# Patient Record
Sex: FEMALE | Race: ASIAN | NOT HISPANIC OR LATINO
[De-identification: names, ages, dates, MRNs, and addresses within clinical notes are randomized per-mention and may not be internally consistent; named-entity substitution may affect disease eponyms.]

---

## 2022-04-09 PROBLEM — Z00.00 ENCOUNTER FOR PREVENTIVE HEALTH EXAMINATION: Status: ACTIVE | Noted: 2022-04-09

## 2022-04-11 ENCOUNTER — APPOINTMENT (OUTPATIENT)
Dept: CARDIOLOGY | Facility: CLINIC | Age: 72
End: 2022-04-11
Payer: MEDICARE

## 2022-04-11 ENCOUNTER — NON-APPOINTMENT (OUTPATIENT)
Age: 72
End: 2022-04-11

## 2022-04-11 VITALS
OXYGEN SATURATION: 98 % | TEMPERATURE: 97.9 F | HEIGHT: 62.99 IN | HEART RATE: 88 BPM | BODY MASS INDEX: 24.45 KG/M2 | DIASTOLIC BLOOD PRESSURE: 104 MMHG | WEIGHT: 138 LBS | SYSTOLIC BLOOD PRESSURE: 171 MMHG | RESPIRATION RATE: 18 BRPM

## 2022-04-11 DIAGNOSIS — Z82.49 FAMILY HISTORY OF ISCHEMIC HEART DISEASE AND OTHER DISEASES OF THE CIRCULATORY SYSTEM: ICD-10-CM

## 2022-04-11 DIAGNOSIS — E05.90 THYROTOXICOSIS, UNSPECIFIED W/OUT THYROTOXIC CRISIS OR STORM: ICD-10-CM

## 2022-04-11 PROCEDURE — 93015 CV STRESS TEST SUPVJ I&R: CPT

## 2022-04-11 PROCEDURE — 93306 TTE W/DOPPLER COMPLETE: CPT

## 2022-04-11 PROCEDURE — 99204 OFFICE O/P NEW MOD 45 MIN: CPT | Mod: 25

## 2022-04-11 PROCEDURE — 93000 ELECTROCARDIOGRAM COMPLETE: CPT | Mod: 59

## 2022-04-18 ENCOUNTER — NON-APPOINTMENT (OUTPATIENT)
Age: 72
End: 2022-04-18

## 2022-04-20 ENCOUNTER — NON-APPOINTMENT (OUTPATIENT)
Age: 72
End: 2022-04-20

## 2023-03-07 PROBLEM — I10 HTN (HYPERTENSION): Status: ACTIVE | Noted: 2022-04-11

## 2023-03-08 ENCOUNTER — APPOINTMENT (OUTPATIENT)
Dept: CARDIOLOGY | Facility: CLINIC | Age: 73
End: 2023-03-08
Payer: MEDICARE

## 2023-03-08 ENCOUNTER — NON-APPOINTMENT (OUTPATIENT)
Age: 73
End: 2023-03-08

## 2023-03-08 VITALS
SYSTOLIC BLOOD PRESSURE: 144 MMHG | BODY MASS INDEX: 24.63 KG/M2 | OXYGEN SATURATION: 97 % | WEIGHT: 139 LBS | HEART RATE: 73 BPM | DIASTOLIC BLOOD PRESSURE: 82 MMHG | TEMPERATURE: 97 F | RESPIRATION RATE: 18 BRPM

## 2023-03-08 DIAGNOSIS — R07.89 OTHER CHEST PAIN: ICD-10-CM

## 2023-03-08 DIAGNOSIS — I10 ESSENTIAL (PRIMARY) HYPERTENSION: ICD-10-CM

## 2023-03-08 DIAGNOSIS — R00.2 PALPITATIONS: ICD-10-CM

## 2023-03-08 PROCEDURE — 93000 ELECTROCARDIOGRAM COMPLETE: CPT

## 2023-03-08 PROCEDURE — 93306 TTE W/DOPPLER COMPLETE: CPT

## 2023-03-08 PROCEDURE — 99214 OFFICE O/P EST MOD 30 MIN: CPT | Mod: 25

## 2023-03-21 LAB
ANION GAP SERPL CALC-SCNC: 14 MMOL/L
BUN SERPL-MCNC: 30 MG/DL
CALCIUM SERPL-MCNC: 9.7 MG/DL
CHLORIDE SERPL-SCNC: 104 MMOL/L
CO2 SERPL-SCNC: 23 MMOL/L
CREAT SERPL-MCNC: 1.03 MG/DL
EGFR: 57 ML/MIN/1.73M2
GLUCOSE SERPL-MCNC: 258 MG/DL
POTASSIUM SERPL-SCNC: 4.3 MMOL/L
SODIUM SERPL-SCNC: 142 MMOL/L

## 2023-03-29 ENCOUNTER — NON-APPOINTMENT (OUTPATIENT)
Age: 73
End: 2023-03-29

## 2023-04-10 NOTE — HISTORY OF PRESENT ILLNESS
[FreeTextEntry1] : 71 year-old female with HTN, DM, HLD and hyperthyroid dx in 1997 in remission and was off of medications presents for evaluation of CP. 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 She reports fast HR at 6-7 pm with palpitations and profuse sweating even for mild activity lasting hours. Patient reports 2 episodes of feeling sweaty and CP, once on the bus. Her hyperthyroid BT was normal few months ago but she felt that her old symptoms came back. She also felt her hands also trembling. Patient is on increased dose of Metoprolol  mg for the last 4 months. Losartan 100 mg, Amlodipine 2.5 mg, Atorvastatin 10 mg. Patient reports that her brother passed away from MI one month after getting vaccine. He had CP symptoms prior. Her A1C is 7.1, but she is not on meds yet. Patient did not take ASA 81 mg as prescribed by her PCP.  I advised patient to wear a Holter monitor. I advised patient to undergo an echocardiogram and a treadmill stress test. I advised patient to get plasma metanephrine test with PCP.

## 2023-04-10 NOTE — DISCUSSION/SUMMARY
[FreeTextEntry1] : 72 year-old female with HTN, DM, HLD, hyperthyroid dx in 1997 in remission and was off of medications presents for followup.  \par \par Patient was last seen on 4/11/22 for evaluation of CP.  Patient underwent an echocardiogram and it showed normal LV function with moderate MR.  Patient underwent a treadmill stress test and completed 3.5 minutes of Michael protocol.  There was no ECG evidence of ischemia.   Following treadmill stress, there was no echocardiographic evidence of ischemia.  Patient wore a Holter and it showed average HR 76, 5.08% PACs and one PAT 6 beats at a rate of 98.  Pt increased Metoprolol ER to 100 mg.\par \par She is on Metoprolol  mg and Diltiazem  mg for HTN and ectopy suppression.  She is on Losartan 100 mg for HTN.  She is on Atorvastatin 10 mg for HLD.  \par \par 3/8/23 - Patient reports palpitations and CP,  needing to take a deep breath.  I advised patient to wear E vent monitor I advised patient to undergo an echocardiogram.  I advised patient to get CTA at [facility]. Patient wore a 7-day monitor and it showed average HR 72, rare PACs, rare PVCs, and 1 couplet.  Pt's symptom is likely associated w. APC's and PVCs.  Patient is on Metoprolol  mg. \par \par (1) CP, palpitations - Patient underwent an echocardiogram and it showed normal LV function with mild-moderate MR.  Patient wore a 7-day monitor and it showed average HR 72, rare PACs, rare PVCs, and 1 couplet.  Pt's symptom may be due to APC's and PVCs.  I advised patient to continue Metoprolol  mg and Diltiazem  mg for ectopy suppression.  I advised patient to undergo a CTA of coronary arteries.  I will obtain insurance authorization. \par \par (2) HTN - Her BP was borderline.  I advised patient to continue Losartan 100 mg, Metoprolol  mg, and Diltiazem  mg for HTN.\par \par (3) Followup - pending CTA of the coronary arteries.

## 2023-04-10 NOTE — REASON FOR VISIT
[FreeTextEntry1] : 72 year-old female with HTN, DM, HLD, hyperthyroid dx in 1997 in remission and was off of medications presents for followup.  \par \par Patient was last seen on 4/11/22 for evaluation of CP.  Patient underwent an echocardiogram and it showed normal LV function with moderate MR.  Patient underwent a treadmill stress test and completed 3.5 minutes of Michael protocol.  There was no ECG evidence of ischemia.   Following treadmill stress, there was no echocardiographic evidence of ischemia.  Patient wore a Holter and it showed average HR 76, 5.08% PACs and one PAT 6 beats at a rate of 98.  Pt increased Metoprolol ER to 100 mg.\par \par She is on Metoprolol  mg and Diltiazem  mg for HTN and ectopy suppression.  She is on Losartan 100 mg for HTN.  She is on Atorvastatin 10 mg for HLD.

## 2023-04-10 NOTE — HISTORY OF PRESENT ILLNESS
[FreeTextEntry1] : 3/8/23 - Patient reports palpitations and CP,  needing to take a deep breath.  I advised patient to wear E vent monitor I advised patient to undergo an echocardiogram.  I advised patient to get CTA at [facility]. Patient wore a 7-day monitor and it showed average HR 72, rare PACs, rare PVCs, and 1 couplet.  Pt's symptom is likely associated w. PVCs.  Patient is on Metoprolol  mg. 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 She reports fast HR at 6-7 pm with palpitations and profuse sweating even for mild activity lasting hours. Patient reports 2 episodes of feeling sweaty and CP, once on the bus. Her hyperthyroid BT was normal few months ago but she felt that her old symptoms came back. She also felt her hands also trembling. Patient is on increased dose of Metoprolol  mg for the last 4 months. Losartan 100 mg, Amlodipine 2.5 mg, Atorvastatin 10 mg. Patient reports that her brother passed away from MI one month after getting vaccine. He had CP symptoms prior. Her A1C is 7.1, but she is not on meds yet. Patient did not take ASA 81 mg as prescribed by her PCP.  I advised patient to wear a Holter monitor. I advised patient to undergo an echocardiogram and a treadmill stress test. I advised patient to get plasma metanephrine test with PCP.  Patient underwent an echocardiogram and it showed normal LV function with moderate AR.  Patient underwent a treadmill stress test and completed 3.5 minutes of Michael protocol.  There was no ECG evidence of ischemia.   Following treadmill stress, there was no echocardiographic evidence of ischemia.  Patient wore a Holter and it showed average HR 76, 5.08% PACs and one PAT 6 beats at a rate of 98.  Pt increased Metoprolol ER to 100 mg.

## 2023-04-11 ENCOUNTER — NON-APPOINTMENT (OUTPATIENT)
Age: 73
End: 2023-04-11

## 2023-04-17 RX ORDER — NITROGLYCERIN 0.4 MG/1
0.4 TABLET SUBLINGUAL
Qty: 1 | Refills: 0 | Status: ACTIVE | COMMUNITY
Start: 2023-04-17 | End: 1900-01-01

## 2024-03-17 ENCOUNTER — TRANSCRIPTION ENCOUNTER (OUTPATIENT)
Age: 74
End: 2024-03-17

## 2024-03-18 ENCOUNTER — TRANSCRIPTION ENCOUNTER (OUTPATIENT)
Age: 74
End: 2024-03-18

## 2024-08-27 ENCOUNTER — NON-APPOINTMENT (OUTPATIENT)
Age: 74
End: 2024-08-27

## 2024-08-27 NOTE — PHYSICAL EXAM
[Well Developed] : well developed [Well Nourished] : well nourished [No Acute Distress] : no acute distress [Normal Conjunctiva] : normal conjunctiva [Normal Venous Pressure] : normal venous pressure [No Carotid Bruit] : no carotid bruit [Normal S1, S2] : normal S1, S2 [No Murmur] : no murmur [No Rub] : no rub [No Gallop] : no gallop [Clear Lung Fields] : clear lung fields [Good Air Entry] : good air entry [No Respiratory Distress] : no respiratory distress  [Soft] : abdomen soft [Non Tender] : non-tender [No Masses/organomegaly] : no masses/organomegaly [Normal Bowel Sounds] : normal bowel sounds [Normal Gait] : normal gait [No Cyanosis] : no cyanosis [No Edema] : no edema [No Clubbing] : no clubbing [No Varicosities] : no varicosities [Moves all extremities] : moves all extremities [No Focal Deficits] : no focal deficits [Normal Speech] : normal speech [Alert and Oriented] : alert and oriented [Normal memory] : normal memory

## 2024-08-27 NOTE — REASON FOR VISIT
[FreeTextEntry1] : 74 year-old female with HTN, DM, HLD, hyperthyroid dx in 1997 in remission and was off of medications, presents for followup.    Patient was last seen on 3/8/23 - Patient reports palpitations and CP,  needing to take a deep breath.  I advised patient to wear E vent monitor I advised patient to undergo an echocardiogram.  Patient underwent an echocardiogram and it showed normal LV function without significant valvular pathology.  I advised patient to get CTA at [facility].  CTA of the coronary arteries showed no CAD with calcium score of 0. Superficial myocardial bridge noted.  I advised patient to consider GI medication for CP.  Patient wore a 7-day monitor and it showed average HR 72, rare PACs, rare PVCs, and 1 couplet.  Pt's symptom is likely associated w. PVCs.  Patient is on Metoprolol  mg.    She is on Metoprolol  mg and Diltiazem  mg for HTN and ectopy suppression.  She is on Losartan 100 mg for HTN.  She is on Atorvastatin 10 mg for HLD.    Patient underwent an echocardiogram 4/11/22 and it showed normal LV function with moderate MR.    Patient underwent a treadmill stress test 4/11/22 and completed 3.5 minutes of Michael protocol.  There was no ECG evidence of ischemia.   Following treadmill stress, there was no echocardiographic evidence of ischemia.    Patient wore a Holter 4/11/22 and it showed average HR 76, 5.08% PACs and one PAT 6 beats at a rate of 98.  Pt increased Metoprolol ER to 100 mg.

## 2024-08-27 NOTE — HISTORY OF PRESENT ILLNESS
[FreeTextEntry1] : 3/8/23 - Patient reports palpitations and CP,  needing to take a deep breath.  I advised patient to wear E vent monitor I advised patient to undergo an echocardiogram.  Patient underwent an echocardiogram and it showed normal LV function without significant valvular pathology.  I advised patient to get CTA at [facility].  CTA of the coronary arteries showed no CAD with calcium score of 0. Superficial myocardial bridge noted.  I advised patient to consider GI medication for CP.  Patient wore a 7-day monitor and it showed average HR 72, rare PACs, rare PVCs, and 1 couplet.  Pt's symptom is likely associated w. PVCs.  Patient is on Metoprolol  mg.   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 She reports fast HR at 6-7 pm with palpitations and profuse sweating even for mild activity lasting hours. Patient reports 2 episodes of feeling sweaty and CP, once on the bus. Her hyperthyroid BT was normal few months ago but she felt that her old symptoms came back. She also felt her hands also trembling. Patient is on increased dose of Metoprolol  mg for the last 4 months. Losartan 100 mg, Amlodipine 2.5 mg, Atorvastatin 10 mg. Patient reports that her brother passed away from MI one month after getting vaccine. He had CP symptoms prior. Her A1C is 7.1, but she is not on meds yet. Patient did not take ASA 81 mg as prescribed by her PCP.  I advised patient to wear a Holter monitor. I advised patient to undergo an echocardiogram and a treadmill stress test. I advised patient to get plasma metanephrine test with PCP.  Patient underwent an echocardiogram and it showed normal LV function with moderate AR.  Patient underwent a treadmill stress test and completed 3.5 minutes of Michael protocol.  There was no ECG evidence of ischemia.   Following treadmill stress, there was no echocardiographic evidence of ischemia.  Patient wore a Holter and it showed average HR 76, 5.08% PACs and one PAT 6 beats at a rate of 98.  Pt increased Metoprolol ER to 100 mg.

## 2024-08-28 ENCOUNTER — APPOINTMENT (OUTPATIENT)
Dept: CARDIOLOGY | Facility: CLINIC | Age: 74
End: 2024-08-28
Payer: MEDICARE

## 2024-08-28 VITALS
WEIGHT: 128 LBS | OXYGEN SATURATION: 97 % | HEART RATE: 71 BPM | SYSTOLIC BLOOD PRESSURE: 106 MMHG | RESPIRATION RATE: 18 BRPM | BODY MASS INDEX: 22.68 KG/M2 | DIASTOLIC BLOOD PRESSURE: 65 MMHG

## 2024-08-28 DIAGNOSIS — I10 ESSENTIAL (PRIMARY) HYPERTENSION: ICD-10-CM

## 2024-08-28 DIAGNOSIS — R00.2 PALPITATIONS: ICD-10-CM

## 2024-08-28 DIAGNOSIS — R07.89 OTHER CHEST PAIN: ICD-10-CM

## 2024-08-28 PROCEDURE — 99214 OFFICE O/P EST MOD 30 MIN: CPT

## 2024-08-28 PROCEDURE — 93306 TTE W/DOPPLER COMPLETE: CPT
